# Patient Record
Sex: FEMALE | Race: WHITE | NOT HISPANIC OR LATINO | Employment: FULL TIME | URBAN - METROPOLITAN AREA
[De-identification: names, ages, dates, MRNs, and addresses within clinical notes are randomized per-mention and may not be internally consistent; named-entity substitution may affect disease eponyms.]

---

## 2017-08-29 DIAGNOSIS — E03.9 HYPOTHYROIDISM: ICD-10-CM

## 2018-01-10 NOTE — MISCELLANEOUS
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 1)   12  Vitamin D deficiency (268 9) (E55 9)    Current Meds   1  Betamethasone Sod Phos & Acet 6 (3-3) MG/ML Injection Suspension; INJECT 6  MG   Subcutaneous; To Be Done: [id number]; Status: HOLD FOR - Administration Ordered   2  Imiquimod 5 % External Cream; Apply to affected area twice weekly for 16 weeks; Therapy: 67IIE6050 to (Last NH:70QQV7838)  Requested for: 91XHD9438 Ordered   3  Loryna 3-0 02 MG Oral Tablet Recorded   4  Synthroid 50 MCG Oral Tablet (Levothyroxine Sodium); TAKE 1 TABLET DAILY; Therapy: 05NAP2402 to (Evaluate:20Apr2017)  Requested for: 25Apr2016; Last   Rx:[id number] Ordered    Allergies    1   No Known Drug Allergies    Signatures   Electronically signed by : Yodit Stewart, ; May 12 2016 11:33AM EST                       (Author)

## 2018-01-16 NOTE — RESULT NOTES
Message   thyroid function test - normal, continue levothyroxine at current dose ,    vitamin d  slightly low - increase supplementations by 1000 iu daily      Verified Results  (1) VITAMIN D 25-HYDROXY [id number] 09:55AM Kelsie Rizvi    Order Number: [id number]  TW Order Number: [id number]     Test Name Result Flag Reference   VIT D 25-HYDROX 29 5 ng/mL L 30 0-100 0     (1) TSH [id number] 09:55AM Kelsie Rizvi    Order Number: [id number]  TW Order Number: [id number] Order Number: [id number]     Test Name Result Flag Reference   TSH 0 824 uIU/mL  0 358-3 740   The recommended reference ranges for TSH during pregnancy are as follows:  First trimester 0 1 to 2 5 uIU/mL  Second trimester  0 2 to 3 0 uIU/mL  Third trimester 0 3 to 3 0 uIU/m     (1) T4, FREE [id number] 09:55AM Zenaida Dunn Order Number: [id number]  TW Order Number: [id number] Order Number: [id number]     Test Name Result Flag Reference   T4,FREE 1 13 ng/dL  0 76-1 46

## 2018-02-06 ENCOUNTER — TELEPHONE (OUTPATIENT)
Dept: ENDOCRINOLOGY | Facility: CLINIC | Age: 28
End: 2018-02-06

## 2018-02-06 DIAGNOSIS — E03.9 HYPOTHYROIDISM, UNSPECIFIED TYPE: Primary | ICD-10-CM

## 2018-02-06 RX ORDER — LEVOTHYROXINE SODIUM 0.05 MG/1
1 TABLET ORAL DAILY
COMMUNITY
Start: 2013-07-15 | End: 2018-04-08 | Stop reason: SDUPTHER

## 2018-02-06 NOTE — TELEPHONE ENCOUNTER
----- Message from Ayse Alicea MA sent at 2/6/2018 10:37 AM EST -----  Regarding: labs  Pt called and is only going to be home in the area from march 7-9th is it possible for you to order blood work for her to do, she says shes not feeling well and you have no opening the days she will be home  Please let patient know I ordered labs-- TSH and Free T4  She hasn't seen us in over 1 year and according to last note following with a physician in [facility] who has adjusted dose of thyroid medication  Please confirm dose of thyroid medication   Also, it looks like she didn't do the last two sets of labs we ordered     If she can not make it to our office at least once a year, she should be following with local physician for labs/refills/etc

## 2018-04-08 DIAGNOSIS — E03.9 HYPOTHYROIDISM, UNSPECIFIED TYPE: Primary | ICD-10-CM

## 2018-04-10 RX ORDER — LEVOTHYROXINE SODIUM 50 MCG
TABLET ORAL
Qty: 90 TABLET | Refills: 1 | Status: SHIPPED | OUTPATIENT
Start: 2018-04-10 | End: 2018-08-10 | Stop reason: DRUGHIGH

## 2018-08-09 ENCOUNTER — APPOINTMENT (OUTPATIENT)
Dept: LAB | Facility: HOSPITAL | Age: 28
End: 2018-08-09
Payer: COMMERCIAL

## 2018-08-09 DIAGNOSIS — E03.9 HYPOTHYROIDISM: ICD-10-CM

## 2018-08-09 LAB
T4 FREE SERPL-MCNC: 0.97 NG/DL (ref 0.76–1.46)
TSH SERPL DL<=0.05 MIU/L-ACNC: 0.87 UIU/ML (ref 0.36–3.74)

## 2018-08-09 PROCEDURE — 36415 COLL VENOUS BLD VENIPUNCTURE: CPT | Performed by: PHYSICIAN ASSISTANT

## 2018-08-09 PROCEDURE — 84439 ASSAY OF FREE THYROXINE: CPT | Performed by: PHYSICIAN ASSISTANT

## 2018-08-09 PROCEDURE — 84443 ASSAY THYROID STIM HORMONE: CPT | Performed by: PHYSICIAN ASSISTANT

## 2018-08-10 ENCOUNTER — OFFICE VISIT (OUTPATIENT)
Dept: ENDOCRINOLOGY | Facility: CLINIC | Age: 28
End: 2018-08-10
Payer: COMMERCIAL

## 2018-08-10 VITALS
HEIGHT: 62 IN | SYSTOLIC BLOOD PRESSURE: 110 MMHG | DIASTOLIC BLOOD PRESSURE: 68 MMHG | BODY MASS INDEX: 27.34 KG/M2 | WEIGHT: 148.6 LBS | HEART RATE: 85 BPM

## 2018-08-10 DIAGNOSIS — E03.9 HYPOTHYROIDISM, UNSPECIFIED TYPE: Primary | ICD-10-CM

## 2018-08-10 PROCEDURE — 99213 OFFICE O/P EST LOW 20 MIN: CPT | Performed by: NURSE PRACTITIONER

## 2018-08-10 RX ORDER — LEVOTHYROXINE SODIUM 0.07 MG/1
75 TABLET ORAL DAILY
Qty: 30 TABLET | Refills: 6 | Status: SHIPPED | OUTPATIENT
Start: 2018-08-10 | End: 2019-06-20 | Stop reason: SDUPTHER

## 2018-08-10 NOTE — ASSESSMENT & PLAN NOTE
Since most recent labs were normal and patient reports she has been taking closer to 75 mcg daily as she feels better on that dose will change dose of Synthroid to 75 mcg  Check TSH and T4 in 6 weeks

## 2018-08-10 NOTE — PROGRESS NOTES
Established Patient Progress Note       Chief Complaint   Patient presents with    Hypothyroidism        History of Present Illness:     Guanaco Rojas is a 32 y o  female with a history of hypothyroidism  She has not been see in the office for over two years  She currently lives in [place] but comes back to [place] for her medical care  She reports her PCP in [place] changed her dose of Synthroid to 50 mcg but is unsure why  She reports she has not been taking her dose of 50 and taking closer to 75 mcg daily  Her most recent TSH was normal at 0 868 and T4 was 0 97  She reports fatigue  Patient Active Problem List   Diagnosis    Hypothyroidism      History reviewed  No pertinent past medical history  History reviewed  No pertinent surgical history  Family History   Problem Relation Age of Onset    Thyroid disease unspecified Sister     Thyroid disease unspecified Maternal Grandfather      Social History   Substance Use Topics    Smoking status: Never Smoker    Smokeless tobacco: Never Used    Alcohol use Yes      Comment: socially     No Known Allergies    Current Outpatient Prescriptions:     levothyroxine (SYNTHROID) 75 mcg tablet, Take 1 tablet (75 mcg total) by mouth daily, Disp: 30 tablet, Rfl: 6    Review of Systems   Constitutional: Positive for fatigue  Negative for chills and fever  HENT: Negative for sore throat and trouble swallowing  Eyes: Negative for visual disturbance  Respiratory: Negative for shortness of breath  Cardiovascular: Negative for chest pain and palpitations  Gastrointestinal: Negative for abdominal pain, constipation and diarrhea  Endocrine: Negative for cold intolerance, heat intolerance, polydipsia, polyphagia and polyuria  Genitourinary: Negative for frequency  Musculoskeletal: Negative for arthralgias and myalgias  Skin: Negative for rash  Neurological: Negative for dizziness and tremors  Hematological: Negative for adenopathy  Psychiatric/Behavioral: Negative for sleep disturbance  All other systems reviewed and are negative  Physical Exam:  Body mass index is 27 18 kg/m²  /68   Pulse 85   Ht 5' 2" (1 575 m)   Wt 67 4 kg (148 lb 9 6 oz)   BMI 27 18 kg/m²    Wt Readings from Last 3 Encounters:   08/10/18 67 4 kg (148 lb 9 6 oz)   12/27/16 68 1 kg (150 lb 3 oz)   11/25/16 67 4 kg (148 lb 8 9 oz)       Physical Exam   Constitutional: She is oriented to person, place, and time  She appears well-developed and well-nourished  No distress  HENT:   Head: Normocephalic and atraumatic  Eyes: Conjunctivae are normal  Pupils are equal, round, and reactive to light  Neck: Normal range of motion  Neck supple  No thyromegaly present  Cardiovascular: Normal rate, regular rhythm and normal heart sounds  Pulmonary/Chest: Effort normal and breath sounds normal  No respiratory distress  She has no wheezes  She has no rales  Abdominal: Soft  Bowel sounds are normal  She exhibits no distension  There is no tenderness  Musculoskeletal: Normal range of motion  She exhibits no edema  Neurological: She is alert and oriented to person, place, and time  Skin: Skin is warm and dry  Psychiatric: She has a normal mood and affect  Vitals reviewed  Labs:       Component      Latest Ref Rng & Units 8/9/2018   TSH 3RD GENERATON      0 358 - 3 740 uIU/mL 0 868   Free T4      0 76 - 1 46 ng/dL 0 97       Impression & Plan:    Problem List Items Addressed This Visit     Hypothyroidism - Primary     Since most recent labs were normal and patient reports she has been taking closer to 75 mcg daily as she feels better on that dose will change dose of Synthroid to 75 mcg  Check TSH and T4 in 6 weeks            Relevant Medications    levothyroxine (SYNTHROID) 75 mcg tablet    Other Relevant Orders    T4, free    TSH, 3rd generation          Orders Placed This Encounter   Procedures    T4, free     Standing Status:   Future Standing Expiration Date:   8/10/2019    TSH, 3rd generation     This is a patient instruction: This test is non-fasting  Please drink two glasses of water morning of bloodwork  Standing Status:   Future     Standing Expiration Date:   8/10/2019       Discussed with the patient and all questioned fully answered  She will call me if any problems arise  Follow-up appointment in 6 months       Counseled patient on diagnostic results, prognosis, risk and benefit of treatment options, instruction for management, importance of treatment compliance, Risk  factor reduction and impressions      Aaron Hein [address]

## 2019-01-03 ENCOUNTER — TELEPHONE (OUTPATIENT)
Dept: ENDOCRINOLOGY | Facility: CLINIC | Age: 29
End: 2019-01-03

## 2019-01-03 NOTE — TELEPHONE ENCOUNTER
Patient would like to know if you could order labs for her  She said her sister has hashimotos and she feels like she may have it also  Is there a test that you can order to check for this? She does live in [place] but she commutes her to see you  She is trying to find a doctor there but cannot find anyone that is really helpful  I told her I would mail the slips to her  Her number is [phone]  Thank you

## 2019-01-04 DIAGNOSIS — E03.9 HYPOTHYROIDISM, UNSPECIFIED TYPE: Primary | ICD-10-CM

## 2019-01-04 NOTE — TELEPHONE ENCOUNTER
Hashimoto thyroiditis is the most common cause of an under active thyroid  I can order the antibodies however that is not going to change her management as we are going to follow the TSH and adjust her Synthroid according to that    Please send her a lab slip for TSH, free T4, TPO and thyroglobulin antibodies and vitamin-D 25 hydroxy

## 2019-06-20 ENCOUNTER — OFFICE VISIT (OUTPATIENT)
Dept: ENDOCRINOLOGY | Facility: CLINIC | Age: 29
End: 2019-06-20
Payer: COMMERCIAL

## 2019-06-20 VITALS
DIASTOLIC BLOOD PRESSURE: 80 MMHG | HEIGHT: 62 IN | HEART RATE: 79 BPM | WEIGHT: 151.4 LBS | SYSTOLIC BLOOD PRESSURE: 106 MMHG | BODY MASS INDEX: 27.86 KG/M2

## 2019-06-20 DIAGNOSIS — E03.9 HYPOTHYROIDISM, UNSPECIFIED TYPE: Primary | ICD-10-CM

## 2019-06-20 DIAGNOSIS — L68.0 HIRSUTISM: ICD-10-CM

## 2019-06-20 DIAGNOSIS — E55.9 VITAMIN D DEFICIENCY: ICD-10-CM

## 2019-06-20 PROCEDURE — 99214 OFFICE O/P EST MOD 30 MIN: CPT | Performed by: INTERNAL MEDICINE

## 2019-06-20 RX ORDER — LEVOTHYROXINE SODIUM 0.07 MG/1
75 TABLET ORAL DAILY
Qty: 30 TABLET | Refills: 6 | Status: SHIPPED | OUTPATIENT
Start: 2019-06-20 | End: 2019-09-19

## 2019-09-16 ENCOUNTER — TELEPHONE (OUTPATIENT)
Dept: ENDOCRINOLOGY | Facility: CLINIC | Age: 29
End: 2019-09-16

## 2019-09-18 NOTE — TELEPHONE ENCOUNTER
tsh ok- however requirements can gi higher during pregnancy - is she taking levothyroxine 75 mcg daily ?  If so increase to 88 mcg daily and repeat tsh and free t4 in 4 weeks

## 2019-09-19 DIAGNOSIS — E03.9 HYPOTHYROIDISM, UNSPECIFIED TYPE: Primary | ICD-10-CM

## 2019-09-19 RX ORDER — LEVOTHYROXINE SODIUM 88 MCG
88 TABLET ORAL DAILY
Qty: 30 TABLET | Refills: 5 | Status: SHIPPED | OUTPATIENT
Start: 2019-09-19 | End: 2020-03-10

## 2019-11-06 ENCOUNTER — TELEPHONE (OUTPATIENT)
Dept: ENDOCRINOLOGY | Facility: CLINIC | Age: 29
End: 2019-11-06

## 2019-11-06 DIAGNOSIS — E03.9 HYPOTHYROIDISM, UNSPECIFIED TYPE: Primary | ICD-10-CM

## 2019-11-06 NOTE — TELEPHONE ENCOUNTER
----- Message from Babs Ahumada, MD sent at 11/6/2019  8:56 AM EST -----  Please call the patient regarding labs - TSH optimal-continue levothyroxine at current dose and repeat TSH and free T4 in 2 months

## 2020-01-14 ENCOUNTER — TELEPHONE (OUTPATIENT)
Dept: ENDOCRINOLOGY | Facility: CLINIC | Age: 30
End: 2020-01-14

## 2020-01-14 NOTE — TELEPHONE ENCOUNTER
----- Message from [place] sent at 1/14/2020 12:37 PM EST -----  Please call the patient regarding her abnormal result   Normal thyroid function test

## 2020-01-15 NOTE — PROGRESS NOTES
Established Patient Progress Note       Chief Complaint   Patient presents with    Hypothyroidism        History of Present Illness:     Chrissie James is a 30 y o  female with a history of hypothyroidism  She is currently 24 weeks pregnant with MARIA VICTORIA of 5/08/20  She is currently taking Synthroid 88 mcg daily  She is taking this regularly and properly  Her most recent thyroid function test was normal  She reports fatigue  Patient Active Problem List   Diagnosis    Hypothyroidism    Vitamin D deficiency    Hirsutism      History reviewed  No pertinent past medical history  History reviewed  No pertinent surgical history  Family History   Problem Relation Age of Onset    Thyroid disease unspecified Sister     Thyroid cancer Sister     Thyroid disease unspecified Maternal Grandfather      Social History     Tobacco Use    Smoking status: Never Smoker    Smokeless tobacco: Never Used   Substance Use Topics    Alcohol use: Yes     Comment: socially     No Known Allergies    Current Outpatient Medications:     SYNTHROID 88 MCG tablet, Take 1 tablet (88 mcg total) by mouth daily, Disp: 30 tablet, Rfl: 5    Review of Systems   Constitutional: Positive for fatigue  Negative for chills and fever  HENT: Negative for sore throat and trouble swallowing  Eyes: Negative for visual disturbance  Respiratory: Negative for shortness of breath  Cardiovascular: Negative for chest pain and palpitations  Gastrointestinal: Negative for abdominal pain, constipation and diarrhea  Endocrine: Negative for cold intolerance, heat intolerance, polydipsia, polyphagia and polyuria  Genitourinary: Negative for frequency  Musculoskeletal: Negative for arthralgias and myalgias  Skin: Negative for rash  Neurological: Negative for dizziness and tremors  Hematological: Negative for adenopathy  Psychiatric/Behavioral: Negative for sleep disturbance     All other systems reviewed and are negative  Physical Exam:  Body mass index is 29 81 kg/m²  /58   Pulse 58   Wt 73 9 kg (163 lb)   BMI 29 81 kg/m²    Wt Readings from Last 3 Encounters:   01/16/20 73 9 kg (163 lb)   06/20/19 68 7 kg (151 lb 6 4 oz)   08/10/18 67 4 kg (148 lb 9 6 oz)       Physical Exam   Constitutional: She is oriented to person, place, and time  She appears well-developed and well-nourished  No distress  HENT:   Head: Normocephalic and atraumatic  Eyes: Pupils are equal, round, and reactive to light  Conjunctivae are normal    Neck: Normal range of motion  Neck supple  No thyromegaly present  Cardiovascular: Normal rate, regular rhythm and normal heart sounds  Pulmonary/Chest: Effort normal and breath sounds normal  No respiratory distress  She has no wheezes  She has no rales  Abdominal: Soft  Bowel sounds are normal  She exhibits no distension  There is no tenderness  Musculoskeletal: Normal range of motion  She exhibits no edema  Neurological: She is alert and oriented to person, place, and time  Skin: Skin is warm and dry  Psychiatric: She has a normal mood and affect  Vitals reviewed  Labs:     1/12/20          Impression & Plan:    Problem List Items Addressed This Visit        Endocrine    Hypothyroidism - Primary     TSH at goal for second trimester of pregnancy  Plan to repeat thyroid function test in third trimester and 6 weeks postpartum  Continue current dose of Synthroid for now  Relevant Orders    T4, free    TSH, 3rd generation    T4, free    TSH, 3rd generation          Orders Placed This Encounter   Procedures    T4, free     Standing Status:   Future     Standing Expiration Date:   1/16/2021    TSH, 3rd generation     This is a patient instruction: This test is non-fasting  Please drink two glasses of water morning of bloodwork          Standing Status:   Future     Standing Expiration Date:   1/16/2021    T4, free     Standing Status:   Future     Standing Expiration Date:   1/16/2021    TSH, 3rd generation     This is a patient instruction: This test is non-fasting  Please drink two glasses of water morning of bloodwork  Standing Status:   Future     Standing Expiration Date:   1/16/2021       Discussed with the patient and all questioned fully answered  She will call me if any problems arise        Counseled patient on diagnostic results, prognosis, risk and benefit of treatment options, instruction for management, importance of treatment compliance, Risk  factor reduction and impressions      Aaron Hein [address] [place]

## 2020-01-16 ENCOUNTER — OFFICE VISIT (OUTPATIENT)
Dept: ENDOCRINOLOGY | Facility: CLINIC | Age: 30
End: 2020-01-16
Payer: COMMERCIAL

## 2020-01-16 VITALS
WEIGHT: 163 LBS | HEART RATE: 58 BPM | BODY MASS INDEX: 29.81 KG/M2 | DIASTOLIC BLOOD PRESSURE: 58 MMHG | SYSTOLIC BLOOD PRESSURE: 100 MMHG

## 2020-01-16 DIAGNOSIS — E03.9 HYPOTHYROIDISM, UNSPECIFIED TYPE: Primary | ICD-10-CM

## 2020-01-16 PROCEDURE — 99214 OFFICE O/P EST MOD 30 MIN: CPT | Performed by: NURSE PRACTITIONER

## 2020-01-16 NOTE — ASSESSMENT & PLAN NOTE
TSH at goal for second trimester of pregnancy  Plan to repeat thyroid function test in third trimester and 6 weeks postpartum  Continue current dose of Synthroid for now

## 2020-01-20 ENCOUNTER — TELEPHONE (OUTPATIENT)
Dept: ENDOCRINOLOGY | Facility: CLINIC | Age: 30
End: 2020-01-20

## 2020-01-21 ENCOUNTER — TELEPHONE (OUTPATIENT)
Dept: ENDOCRINOLOGY | Facility: CLINIC | Age: 30
End: 2020-01-21

## 2020-03-10 DIAGNOSIS — E03.9 HYPOTHYROIDISM, UNSPECIFIED TYPE: ICD-10-CM

## 2020-03-10 RX ORDER — LEVOTHYROXINE SODIUM 88 MCG
TABLET ORAL
Qty: 90 TABLET | Refills: 1 | Status: SHIPPED | OUTPATIENT
Start: 2020-03-10 | End: 2020-09-22

## 2020-03-30 ENCOUNTER — TELEPHONE (OUTPATIENT)
Dept: ENDOCRINOLOGY | Facility: CLINIC | Age: 30
End: 2020-03-30

## 2020-03-30 DIAGNOSIS — E03.9 HYPOTHYROIDISM, UNSPECIFIED TYPE: Primary | ICD-10-CM

## 2020-03-30 DIAGNOSIS — E55.9 VITAMIN D DEFICIENCY: ICD-10-CM

## 2020-03-30 DIAGNOSIS — L68.0 HIRSUTISM: ICD-10-CM

## 2020-03-30 NOTE — TELEPHONE ENCOUNTER
----- Message from Maryjane Lechuga MD sent at 3/30/2020  2:54 PM EDT -----  Please call the patient regarding labs -Labs reviewed  -TSH at  goal-continue Synthroid at current dose-after delivery decrease synthroid  88 mcg to 1 tablet Monday to Saturday only-repeat labs as ordered in [place]

## 2020-06-22 ENCOUNTER — TELEPHONE (OUTPATIENT)
Dept: ENDOCRINOLOGY | Facility: CLINIC | Age: 30
End: 2020-06-22

## 2020-06-25 ENCOUNTER — TELEPHONE (OUTPATIENT)
Dept: ENDOCRINOLOGY | Facility: CLINIC | Age: 30
End: 2020-06-25

## 2020-06-25 DIAGNOSIS — E03.9 HYPOTHYROIDISM, UNSPECIFIED TYPE: Primary | ICD-10-CM

## 2020-06-30 ENCOUNTER — TELEPHONE (OUTPATIENT)
Dept: ENDOCRINOLOGY | Facility: CLINIC | Age: 30
End: 2020-06-30

## 2020-08-05 ENCOUNTER — TELEPHONE (OUTPATIENT)
Dept: ENDOCRINOLOGY | Facility: CLINIC | Age: 30
End: 2020-08-05

## 2020-08-05 NOTE — TELEPHONE ENCOUNTER
----- Message from Jarocho Cardozo MD sent at 8/5/2020  8:21 AM EDT -----  Please call the patient regarding labs - tsh optimal , continue synthroid at current dose

## 2020-09-22 DIAGNOSIS — E03.9 HYPOTHYROIDISM, UNSPECIFIED TYPE: ICD-10-CM

## 2020-09-22 RX ORDER — LEVOTHYROXINE SODIUM 88 MCG
TABLET ORAL
Qty: 90 TABLET | Refills: 1 | Status: SHIPPED | OUTPATIENT
Start: 2020-09-22 | End: 2020-10-05 | Stop reason: SDUPTHER

## 2020-10-02 ENCOUNTER — TELEPHONE (OUTPATIENT)
Dept: ENDOCRINOLOGY | Facility: CLINIC | Age: 30
End: 2020-10-02

## 2020-10-05 ENCOUNTER — TELEMEDICINE (OUTPATIENT)
Dept: ENDOCRINOLOGY | Facility: CLINIC | Age: 30
End: 2020-10-05
Payer: COMMERCIAL

## 2020-10-05 ENCOUNTER — OFFICE VISIT (OUTPATIENT)
Dept: SURGERY | Facility: CLINIC | Age: 30
End: 2020-10-05
Payer: COMMERCIAL

## 2020-10-05 VITALS — TEMPERATURE: 99.2 F

## 2020-10-05 DIAGNOSIS — E03.9 HYPOTHYROIDISM, UNSPECIFIED TYPE: ICD-10-CM

## 2020-10-05 DIAGNOSIS — E55.9 VITAMIN D DEFICIENCY: ICD-10-CM

## 2020-10-05 DIAGNOSIS — E03.9 HYPOTHYROIDISM, UNSPECIFIED TYPE: Primary | ICD-10-CM

## 2020-10-05 DIAGNOSIS — T81.89XA SUTURE GRANULOMA: Primary | ICD-10-CM

## 2020-10-05 PROCEDURE — 99203 OFFICE O/P NEW LOW 30 MIN: CPT | Performed by: SURGERY

## 2020-10-05 PROCEDURE — 99214 OFFICE O/P EST MOD 30 MIN: CPT | Performed by: INTERNAL MEDICINE

## 2020-10-05 RX ORDER — LEVOTHYROXINE SODIUM 88 MCG
TABLET ORAL
Qty: 30 TABLET | Refills: 0 | Status: SHIPPED | OUTPATIENT
Start: 2020-10-05 | End: 2020-11-01

## 2020-10-08 PROBLEM — T81.89XA SUTURE GRANULOMA: Status: ACTIVE | Noted: 2020-10-08

## 2020-10-30 DIAGNOSIS — E03.9 HYPOTHYROIDISM, UNSPECIFIED TYPE: ICD-10-CM

## 2020-11-01 RX ORDER — LEVOTHYROXINE SODIUM 88 MCG
TABLET ORAL
Qty: 30 TABLET | Refills: 0 | Status: SHIPPED | OUTPATIENT
Start: 2020-11-01 | End: 2020-11-16 | Stop reason: SDUPTHER

## 2020-11-02 ENCOUNTER — TELEPHONE (OUTPATIENT)
Dept: ENDOCRINOLOGY | Facility: CLINIC | Age: 30
End: 2020-11-02

## 2020-11-16 DIAGNOSIS — E03.9 HYPOTHYROIDISM, UNSPECIFIED TYPE: ICD-10-CM

## 2020-11-16 RX ORDER — LEVOTHYROXINE SODIUM 88 MCG
TABLET ORAL
Qty: 90 TABLET | Refills: 0 | Status: SHIPPED | OUTPATIENT
Start: 2020-11-16 | End: 2021-04-02

## 2021-03-08 ENCOUNTER — TELEPHONE (OUTPATIENT)
Dept: HEMATOLOGY ONCOLOGY | Facility: CLINIC | Age: 31
End: 2021-03-08

## 2021-03-08 NOTE — TELEPHONE ENCOUNTER
Patient called wanting to records from Dr Leander Jc office mailed to her address  Printed office notes from 4/2016-12/2016, imaging, and a pathology report  At the patients request I mailed the records to her address, [address] [place] [address], [phone]  Patient was very apperceptive of the assistance

## 2021-04-02 DIAGNOSIS — E03.9 HYPOTHYROIDISM, UNSPECIFIED TYPE: ICD-10-CM

## 2021-04-02 RX ORDER — LEVOTHYROXINE SODIUM 88 MCG
TABLET ORAL
Qty: 90 TABLET | Refills: 0 | Status: SHIPPED | OUTPATIENT
Start: 2021-04-02 | End: 2021-07-16

## 2021-06-29 ENCOUNTER — TELEPHONE (OUTPATIENT)
Dept: ENDOCRINOLOGY | Facility: CLINIC | Age: 31
End: 2021-06-29

## 2021-06-29 DIAGNOSIS — E03.9 HYPOTHYROIDISM, UNSPECIFIED TYPE: Primary | ICD-10-CM

## 2021-06-29 NOTE — TELEPHONE ENCOUNTER
My chart message  Guero Houston,     Congrrose mayr! ! We may need to increase your dose  Please Do thyroid lab testing now for TSH/Free T4  I have placed orders in the [email or web address] system  Dr Jaqui Woo is off this week and I am off next week  There will be other providers covering the box but  If you don't hear from the office by the day after your lab testing please call the office to make sure someone reviews labs and knows you are pregnant just to make sure nothing gets missed        You should be seen in person in august and I will have  call to schedule  Jaime Carpenter Dr -   I am about 6 weeks pregnant and you advised me to notify you when I am  I feel ok, not as tired as my last pregnancy  Currently, i am not taking my synthroid on Sundays per your direction  Do you still want me to do that? I can't remember my last pregnancy if had me taking my synthroid every day  Can you send me the bloodwork papers via my chart? No need to mail them to me  Let me know if you want me to do a telemedicine apt with you, my insurance covers them just like regular office appointment  My last appointment with you was October 2020 via telemedicine    I will be home in August  Can you have your staff make me an appointment with you Aug 26 (late afternoon apt) or 27 (open all day ) to see you in person  Then, you can provide me the rest of my blood work paperwork throughout my pregnancy     Rosemarie Knutson

## 2021-07-13 DIAGNOSIS — E03.9 HYPOTHYROIDISM, UNSPECIFIED TYPE: Primary | ICD-10-CM

## 2021-07-16 DIAGNOSIS — E03.9 HYPOTHYROIDISM, UNSPECIFIED TYPE: ICD-10-CM

## 2021-07-16 RX ORDER — LEVOTHYROXINE SODIUM 88 MCG
TABLET ORAL
Qty: 90 TABLET | Refills: 0 | Status: SHIPPED | OUTPATIENT
Start: 2021-07-16 | End: 2021-11-01 | Stop reason: SDUPTHER

## 2021-08-27 ENCOUNTER — TELEMEDICINE (OUTPATIENT)
Dept: ENDOCRINOLOGY | Facility: CLINIC | Age: 31
End: 2021-08-27
Payer: COMMERCIAL

## 2021-08-27 DIAGNOSIS — O99.282 HYPOTHYROIDISM AFFECTING PREGNANCY IN SECOND TRIMESTER: ICD-10-CM

## 2021-08-27 DIAGNOSIS — E55.9 VITAMIN D DEFICIENCY: ICD-10-CM

## 2021-08-27 DIAGNOSIS — E03.9 HYPOTHYROIDISM AFFECTING PREGNANCY IN SECOND TRIMESTER: ICD-10-CM

## 2021-08-27 DIAGNOSIS — E03.9 HYPOTHYROIDISM, UNSPECIFIED TYPE: Primary | ICD-10-CM

## 2021-08-27 PROCEDURE — 99214 OFFICE O/P EST MOD 30 MIN: CPT | Performed by: NURSE PRACTITIONER

## 2021-08-27 NOTE — PROGRESS NOTES
Virtual Regular Visit    Verification of patient location: [place]    Patient is located in the following state in which I hold an active license [place]      Assessment/Plan:    Problem List Items Addressed This Visit        Endocrine    Hypothyroidism - Primary    Relevant Orders    T4, free    TSH, 3rd generation    Hypothyroidism affecting pregnancy in second trimester     TSH currently at goal for second trimester of pregnancy  Will continue to monitor thyroid function closely over course of pregnancy and 6 weeks postpartum  Repeat thyroid function test in 6-8 weeks  Based on results will adjust dose of Synthroid if necessary             Other    Vitamin D deficiency     Check vitamin d level, not currently on supplementation          Relevant Orders    Vitamin D 25 hydroxy               Reason for visit is   Chief Complaint   Patient presents with    Virtual Regular Visit        Encounter provider SUSY Narayanan    Provider located at [address] [facility] [address] [address]      Recent Visits  No visits were found meeting these conditions  Showing recent visits within past 7 days and meeting all other requirements  Today's Visits  Date Type Provider Dept   08/27/21 Telemedicine SUSY Narayanan  [facility]   Showing today's visits and meeting all other requirements  Future Appointments  No visits were found meeting these conditions  Showing future appointments within next 150 days and meeting all other requirements       The patient was identified by name and date of birth  Lennox Lord was informed that this is a telemedicine visit and that the visit is being conducted through [address] Now and patient was informed that this is a secure, HIPAA-compliant platform  She agrees to proceed     My office door was closed  No one else was in the room    She acknowledged consent and understanding of privacy and security of the video platform  The patient has agreed to participate and understands they can discontinue the visit at any time  Patient is aware this is a billable service  Jaleel Martel is a 26 y o  female with a history of hypothyroidism  She is currently 15 weeks pregnant with MARIA VICTORIA of February 10  She is taking Synthroid 88 mcg daily  Her most recent TSH was 1 31 with free T4 1 2  She is feeling well on current dose  She is taking this regularly and properly  She denies symptoms of hypo or hyperthyroidism  No past medical history on file  No past surgical history on file  Current Outpatient Medications   Medication Sig Dispense Refill    Synthroid 88 MCG tablet TAKE ONE TABLET BY MOUTH monday through [facility] AND none ON sunday 90 tablet 0     No current facility-administered medications for this visit  No Known Allergies    Review of Systems   Constitutional: Negative for chills and fever  HENT: Negative for sore throat and trouble swallowing  Eyes: Negative for visual disturbance  Respiratory: Negative for shortness of breath  Cardiovascular: Negative for chest pain and palpitations  Gastrointestinal: Negative for abdominal pain, constipation and diarrhea  Endocrine: Negative for cold intolerance, heat intolerance, polydipsia, polyphagia and polyuria  Genitourinary: Negative for frequency  Musculoskeletal: Negative for arthralgias and myalgias  Skin: Negative for rash  Neurological: Negative for dizziness and tremors  Hematological: Negative for adenopathy  Psychiatric/Behavioral: Negative for sleep disturbance  All other systems reviewed and are negative  Video Exam    There were no vitals filed for this visit  Physical Exam   Constitutional: He is oriented to person, place, and time  He appears well-developed and well-nourished  No distress     HENT:   Head: Normocephalic and atraumatic  Neck: Normal range of motion  Pulmonary/Chest: Effort normal    Musculoskeletal: Normal range of motion  Neurological: He is alert and oriented to person, place, and time  Skin: He is not diaphoretic  Psychiatric: He has a normal mood and affect  His behavior is normal        I spent 30 minutes directly with the patient during this visit    VIRTUAL VISIT DISCLAIMER      Abimbola Saucedo verbally agrees to participate in [employer]  Pt is aware that [employer] could be limited without vital signs or the ability to perform a full hands-on physical Myna Casey understands she or the provider may request at any time to terminate the video visit and request the patient to seek care or treatment in person

## 2021-08-27 NOTE — ASSESSMENT & PLAN NOTE
TSH currently at goal for second trimester of pregnancy  Will continue to monitor thyroid function closely over course of pregnancy and 6 weeks postpartum  Repeat thyroid function test in 6-8 weeks   Based on results will adjust dose of Synthroid if necessary

## 2021-11-01 DIAGNOSIS — E03.9 HYPOTHYROIDISM, UNSPECIFIED TYPE: ICD-10-CM

## 2021-11-01 RX ORDER — LEVOTHYROXINE SODIUM 88 MCG
TABLET ORAL
Qty: 90 TABLET | Refills: 0 | Status: SHIPPED | OUTPATIENT
Start: 2021-11-01 | End: 2022-02-12

## 2022-02-11 DIAGNOSIS — E03.9 HYPOTHYROIDISM, UNSPECIFIED TYPE: ICD-10-CM

## 2022-02-12 RX ORDER — LEVOTHYROXINE SODIUM 88 MCG
TABLET ORAL
Qty: 90 TABLET | Refills: 0 | Status: SHIPPED | OUTPATIENT
Start: 2022-02-12 | End: 2022-05-31

## 2022-02-24 DIAGNOSIS — E03.9 HYPOTHYROIDISM, UNSPECIFIED TYPE: Primary | ICD-10-CM

## 2022-05-31 DIAGNOSIS — E03.9 HYPOTHYROIDISM, UNSPECIFIED TYPE: ICD-10-CM

## 2022-05-31 RX ORDER — LEVOTHYROXINE SODIUM 88 MCG
TABLET ORAL
Qty: 90 TABLET | Refills: 0 | Status: SHIPPED | OUTPATIENT
Start: 2022-05-31

## 2022-09-14 DIAGNOSIS — E03.9 HYPOTHYROIDISM, UNSPECIFIED TYPE: ICD-10-CM

## 2022-09-14 RX ORDER — LEVOTHYROXINE SODIUM 88 MCG
TABLET ORAL
Qty: 90 TABLET | Refills: 0 | Status: SHIPPED | OUTPATIENT
Start: 2022-09-14

## 2023-01-01 NOTE — RESULT NOTES
Message   Thyroid labs normal, can continue synthroid at current dose  Check TSH/Free T4 again in 3 months  RX sent to [facility] in [facility]     Verified Results  (1) TSH [id number] 08:45AM Martha Rizzo Order Number: [id number]     Test Name Result Flag Reference   TSH 1 190 uIU/mL  0 358-3 740   - Patient Instructions: This bloodwork is non-fasting  Please drink two glasses of water morning of bloodwork  - Patient Instructions: This bloodwork is non-fasting  Please drink two glasses of water morning of bloodwork  Patients undergoing fluorescein dye angiography may retain small amounts of fluorescein in the body for 48-72 hours post procedure  Samples containing fluorescein can produce falsely depressed TSH values  If the patient had this procedure,a specimen should be resubmitted post fluorescein clearance  The recommended reference ranges for TSH during pregnancy are as follows:  First trimester 0 1 to 2 5 uIU/mL  Second trimester  0 2 to 3 0 uIU/mL  Third trimester 0 3 to 3 0 uIU/m     (1) T4, FREE [id number] 08:45AM Martha Rizzo Order Number: [id number]     Test Name Result Flag Reference   T4,FREE 1 30 ng/dL  0 76-1 46   - Patient Instructions: This bloodwork is non-fasting  Please drink two glasses of water morning of bloodwork  Plan  Hypothyroidism    · Synthroid 75 MCG Oral Tablet (Synthroid);  Take 1 tablet daily Spoke with mother tested Covid positive inquiring if she can continue to breast feed. Advise mom she can continue to breastfeed and monitor him for any symptoms. Mom in agreement with plan.

## 2023-01-03 DIAGNOSIS — E03.9 HYPOTHYROIDISM, UNSPECIFIED TYPE: ICD-10-CM

## 2023-01-03 RX ORDER — LEVOTHYROXINE SODIUM 88 MCG
TABLET ORAL
Qty: 90 TABLET | Refills: 0 | Status: SHIPPED | OUTPATIENT
Start: 2023-01-03

## 2023-01-03 NOTE — TELEPHONE ENCOUNTER
----- Message from [facility] sent at 12/31/2022  8:25 AM EST -----  Regarding: Lab work   Contact: [phone]  Dr Suzy Eli - i have a week left of my three months supply of synthroid    can you write me another prescription and [facility] [place]  I have an apt with one of your staff members in [facility]  I can do blood work this week for you, if you send  me the paperwork via [address] [place] [address] [phone]  Please and thank you   Take Care AD

## 2023-02-27 ENCOUNTER — TELEPHONE (OUTPATIENT)
Dept: ENDOCRINOLOGY | Facility: CLINIC | Age: 33
End: 2023-02-27

## 2023-02-27 DIAGNOSIS — E03.9 HYPOTHYROIDISM, UNSPECIFIED TYPE: Primary | ICD-10-CM

## 2023-05-08 DIAGNOSIS — E03.9 HYPOTHYROIDISM, UNSPECIFIED TYPE: ICD-10-CM

## 2023-05-08 RX ORDER — LEVOTHYROXINE SODIUM 88 MCG
TABLET ORAL
Qty: 30 TABLET | Refills: 0 | Status: SHIPPED | OUTPATIENT
Start: 2023-05-08

## 2023-06-10 DIAGNOSIS — E03.9 HYPOTHYROIDISM, UNSPECIFIED TYPE: ICD-10-CM

## 2023-06-12 RX ORDER — LEVOTHYROXINE SODIUM 88 MCG
TABLET ORAL
Qty: 30 TABLET | Refills: 0 | OUTPATIENT
Start: 2023-06-12

## 2023-06-13 RX ORDER — LEVOTHYROXINE SODIUM 88 MCG
TABLET ORAL
Qty: 30 TABLET | Refills: 0 | Status: SHIPPED | OUTPATIENT
Start: 2023-06-13 | End: 2023-06-22 | Stop reason: SDUPTHER

## 2023-06-13 NOTE — TELEPHONE ENCOUNTER
Sent Rx for 30 tablets, no refills  Patient can discuss additional refills at her visit with Dr Angie Morgan next week - 6/22/23

## 2023-06-22 ENCOUNTER — OFFICE VISIT (OUTPATIENT)
Dept: ENDOCRINOLOGY | Facility: CLINIC | Age: 33
End: 2023-06-22
Payer: COMMERCIAL

## 2023-06-22 VITALS
HEART RATE: 74 BPM | SYSTOLIC BLOOD PRESSURE: 120 MMHG | HEIGHT: 62 IN | DIASTOLIC BLOOD PRESSURE: 70 MMHG | BODY MASS INDEX: 29.44 KG/M2 | WEIGHT: 160 LBS

## 2023-06-22 DIAGNOSIS — E03.9 HYPOTHYROIDISM, UNSPECIFIED TYPE: Primary | ICD-10-CM

## 2023-06-22 DIAGNOSIS — E06.3 HYPOTHYROIDISM DUE TO HASHIMOTO'S THYROIDITIS: ICD-10-CM

## 2023-06-22 DIAGNOSIS — E03.8 HYPOTHYROIDISM DUE TO HASHIMOTO'S THYROIDITIS: ICD-10-CM

## 2023-06-22 DIAGNOSIS — E55.9 VITAMIN D DEFICIENCY: ICD-10-CM

## 2023-06-22 PROBLEM — O99.282 HYPOTHYROIDISM AFFECTING PREGNANCY IN SECOND TRIMESTER: Status: RESOLVED | Noted: 2021-08-27 | Resolved: 2023-06-22

## 2023-06-22 PROCEDURE — 99214 OFFICE O/P EST MOD 30 MIN: CPT | Performed by: INTERNAL MEDICINE

## 2023-06-22 RX ORDER — DROSPIRENONE AND ETHINYL ESTRADIOL 0.02-3(28)
KIT ORAL
COMMUNITY

## 2023-06-22 RX ORDER — LEVOTHYROXINE SODIUM 88 MCG
TABLET ORAL
Qty: 90 TABLET | Refills: 3 | Status: SHIPPED | OUTPATIENT
Start: 2023-06-22

## 2023-06-22 NOTE — PROGRESS NOTES
Hypothyroidism Rosemarie Danielle 27 y o  female MRN: [id number]    Encounter: [id number]      Assessment/Plan     Problem List Items Addressed This Visit        Endocrine    Hypothyroidism due to Hashimoto's thyroiditis - Primary     TSH is optimal-continue Synthroid at current dose-repeat thyroid function test in 6 months in 1 year  If she decides to stop her OCP she will require adjustment in her dose         Relevant Medications    Synthroid 88 MCG tablet       Other    Vitamin D deficiency     Not on any supplementations-vitamin D 25-hydroxy normal         Relevant Orders    Vitamin D 25 hydroxy Lab Collect       CC: Hypothyroidism    History of Present Illness     HPI:  27 y o  female with hypothyroidism due to Hashimoto thyroiditis and vitamin-D deficiency  Seen in follow-up  C/o fatigue , some night sweats   On OCP for contraception       Currently on Synthroid 88 mcg 1 tab mon-sat and none of Sunday and has been taking it regularly and properly  Waits an hour before breakfast     Not on vitamin D SUPPLEMENTATIONS     Weight - difficulty loosing weight   No constipation and diarrhea     Doesn't sleep well, has 2 kids       Review of Systems    Historical Information   No past medical history on file  No past surgical history on file    Social History   Social History     Substance and Sexual Activity   Alcohol Use Yes    Comment: socially     Social History     Substance and Sexual Activity   Drug Use No     Social History     Tobacco Use   Smoking Status Never   Smokeless Tobacco Never     Family History:   Family History   Problem Relation Age of Onset   • Thyroid disease unspecified Sister    • Thyroid cancer Sister    • Thyroid disease unspecified Maternal Grandfather        Meds/Allergies   Current Outpatient Medications   Medication Sig Dispense Refill   • drospirenone-ethinyl estradiol (Loryna) 3-0 02 MG per tablet Take by mouth     • Synthroid 88 MCG tablet Take 1 tablet (88 mcg) Mon-Sat & none on "Sun 90 tablet 3     No current facility-administered medications for this visit  No Known Allergies    Objective   Vitals: Blood pressure 120/70, pulse 74, height 5' 2\" (1 575 m), weight 72 6 kg (160 lb)  Physical Exam  Vitals reviewed  Constitutional:       General: She is not in acute distress  Appearance: Normal appearance  She is not ill-appearing, toxic-appearing or diaphoretic  HENT:      Head: Normocephalic and atraumatic  Eyes:      General: No scleral icterus  Extraocular Movements: Extraocular movements intact  Cardiovascular:      Rate and Rhythm: Normal rate and regular rhythm  Heart sounds: Normal heart sounds  No murmur heard  Pulmonary:      Effort: Pulmonary effort is normal  No respiratory distress  Breath sounds: Normal breath sounds  No wheezing or rales  Abdominal:      General: There is no distension  Palpations: Abdomen is soft  Tenderness: There is no abdominal tenderness  Musculoskeletal:      Cervical back: Neck supple  Right lower leg: No edema  Left lower leg: No edema  Lymphadenopathy:      Cervical: No cervical adenopathy  Skin:     General: Skin is warm and dry  Neurological:      General: No focal deficit present  Mental Status: She is alert and oriented to person, place, and time  Gait: Gait normal    Psychiatric:         Mood and Affect: Mood normal          Behavior: Behavior normal          Thought Content: Thought content normal          Judgment: Judgment normal          The history was obtained from the review of the chart, patient  Lab Results:      May 2023-TSH 1 2, free T41 1  Vitamin D 25-hydroxy 35 Mg per DL    Imaging Studies:           Portions of the record may have been created with voice recognition software  Occasional wrong word or \"sound a like\" substitutions may have occurred due to the inherent limitations of voice recognition software   Read the chart carefully and recognize, using " context, where substitutions have occurred

## 2023-06-22 NOTE — ASSESSMENT & PLAN NOTE
TSH is optimal-continue Synthroid at current dose-repeat thyroid function test in 6 months in 1 year    If she decides to stop her OCP she will require adjustment in her dose

## 2024-06-03 ENCOUNTER — TELEPHONE (OUTPATIENT)
Age: 34
End: 2024-06-03